# Patient Record
(demographics unavailable — no encounter records)

---

## 2017-03-13 NOTE — DIAGNOSTIC IMAGING REPORT
Indication: CP, cough



Technique: One view of the chest



Comparison: 12/25/2014



Findings: Lungs and pleural spaces are clear. Heart size is normal. No 

significant

interim change



Impression: No acute process

## 2017-03-13 NOTE — EMERGENCY ROOM REPORT
History of Present Illness


General


Chief Complaint:  General Complaint


Source:  Patient





Present Illness


HPI


Patient states has a history of fibroids.  She states she gets anemia and when 

she gets anemia she gets short of breath and fatigued and gets chest pain.  She 

relates this to every time she gets anemia.  She states that she has been 

bleeding heavily for a month and a half every day.  She states that she started 

bleeding again and has been having large clots over the past 2 days.  She 

states she also has chest pain, shortness of breath and is fatigued.  She has 

severe pain in her lower abdomen and is requesting Dilaudid.


Allergies:  


Coded Allergies:  


     NO KNOWN ALLERGIES (Verified  Allergy, Unknown, 5/31/14)


Uncoded Allergies:  


     unknown cause of itching (Allergy, Unknown, 5/31/14)


 Pt complains of itching but cause unknown





Patient History


Past Medical History:  see triage record, CVA/TIA - aneurysm, other - anemia, 

fibroids, chronic pain


Social History:  Reports: drug use - cocaine, Denies: alcohol use, smoking


Last Menstrual Period:  2/18/17


Pregnant Now:  No


Reviewed Nursing Documentation:  PMH: Agreed, PSxH: Agreed





Nursing Documentation-PMH


Past Medical History:  No Stated History


Hx Cardiac Problems:  Yes


Hx Hypertension:  Yes


Hx Asthma:  Yes


Hx Cancer:  No


Hx Gastrointestinal Problems:  No


Hx Neurological Problems:  Yes - BRAIN ANEURSYM 2009


Hx Cerebrovascular Accident:  Yes - brain aneurysm; clipped in 2009


Hx Headaches:  Yes


Hx Neurologic Surgery:  Yes - brain aneurysm





Review of Systems


All Other Systems:  negative except mentioned in HPI





Physical Exam





Vital Signs








  Date Time  Temp Pulse Resp B/P Pulse Ox O2 Delivery O2 Flow Rate FiO2


 


3/13/17 10:32 98.1 112 18 109/63 100 Room Air  








Sp02 EP Interpretation:  reviewed, normal


General Appearance:  no apparent distress, alert, GCS 15, non-toxic


Head:  normocephalic, atraumatic


Eyes:  bilateral eye PERRL, bilateral eye conjunctivae pale, bilateral eye 

normal inspection


ENT:  hearing grossly normal, normal pharynx, no angioedema, normal voice


Neck:  full range of motion, supple/symm/no masses


Respiratory:  chest non-tender, lungs clear, normal breath sounds, speaking 

full sentences


Cardiovascular #1:  no edema, tachycardia, systolic murmur


Gastrointestinal:  normal bowel sounds, soft, non-distended, no guarding, no 

rebound, tenderness - TTP supra-pubic and lower abdomen


Rectal:  deferred


Musculoskeletal:  back normal, gait/station normal, normal range of motion, non-

tender, calf tenderness


Neurologic:  alert, oriented x3, responsive, motor strength/tone normal, 

sensory intact, speech normal


Psychiatric:  judgement/insight normal, memory normal, mood/affect normal, no 

suicidal/homicidal ideation


Skin:  normal color, no rash, warm/dry, well hydrated





Medical Decision Making


Diagnostic Impression:  


 Primary Impression:  


 Anemia


 Additional Impressions:  


 Dysfunctional uterine bleeding


 Fibroids


ER Course


This patient presents with severe anemia.  This is likely related to vaginal 

bleeding and fibroids.  The patient has had normal vital signs during her ED 

course.  Therefore, she did not require emergency release blood and blood 

transfusion was delayed secondary to her having antibodies.  She is admitted 

for blood transfusion and further evaluation and treatment.  Likely this 

patient will need a hysterectomy at some point.  She is admitted for further 

evaluation and treatment.





Labs








Test


  3/13/17


10:55 3/13/17


12:00


 


Sodium Level


  139 mEQ/L


(135-145) 


 


 


Potassium Level


  3.1 mEQ/L


(3.4-4.9) 


 


 


Chloride Level


  101 mEQ/L


() 


 


 


Carbon Dioxide Level


  23 mEQ/L


(20-30) 


 


 


Anion Gap 15 (5-15)  


 


Blood Urea Nitrogen


  14 mg/dL


(7-23) 


 


 


Creatinine


  0.8 mg/dL


(0.5-0.9) 


 


 


Estimat Glomerular Filtration


Rate > 60 mL/min


(>60) 


 


 


Glucose Level


  117 mg/dL


() 


 


 


Calcium Level


  8.7 mg/dL


(8.6-10.2) 


 


 


Total Bilirubin


  0.2 mg/dL


(0.0-1.2) 


 


 


Aspartate Amino Transf


(AST/SGOT) 17 U/L (5-40) 


  


 


 


Alanine Aminotransferase


(ALT/SGPT) 8 U/L (3-33) 


  


 


 


Alkaline Phosphatase


  72 U/L


() 


 


 


Total Creatine Kinase


  67 U/L


() 


 


 


Creatine Kinase MB


  < 1.5 ng/mL (<


3.8) 


 


 


Creatine Kinase MB Relative


Index  


  


 


 


Troponin I


  < 0.30 ng/mL


(<=0.30) 


 


 


Pro-B-Type Natriuretic Peptide


  60 pg/mL


(0-125) 


 


 


Total Protein


  7.2 g/dL


(6.6-8.7) 


 


 


Albumin


  4.0 g/dL


(3.5-5.2) 


 


 


Globulin 3.2 g/dL  


 


Albumin/Globulin Ratio 1.2 (1.0-2.7)  


 


Lipase 26 U/L (< 60)  


 


Thyroid Stimulating Hormone


(TSH) 3.220 uIU/mL


(0.300-4.500) 


 


 


Free Thyroxine


  0.78 ng/dL


(0.86-1.85) 


 


 


Free Triiodothyronine


  3.1 pg/mL


(2.3-4.2) 


 


 


White Blood Count


  


  6.3 K/UL


(4.8-10.8)


 


Red Blood Count


  


  3.01 M/UL


(4.20-5.40)


 


Hemoglobin


  


  4.7 G/DL


(12.0-16.0)


 


Hematocrit


  


  16.9 %


(37.0-47.0)


 


Mean Corpuscular Volume  56 FL (80-99) 


 


Mean Corpuscular Hemoglobin


  


  15.7 PG


(27.0-31.0)


 


Mean Corpuscular Hemoglobin


Concent 


  27.8 G/DL


(32.0-36.0)


 


Red Cell Distribution Width


  


  15.6 %


(11.6-14.8)


 


Platelet Count


  


  338 K/UL


(150-450)


 


Mean Platelet Volume


  


  5.4 FL


(6.5-10.1)


 


Neutrophils (%) (Auto)   % (45.0-75.0) 


 


Lymphocytes (%) (Auto)   % (20.0-45.0) 


 


Monocytes (%) (Auto)   % (1.0-10.0) 


 


Eosinophils (%) (Auto)   % (0.0-3.0) 


 


Basophils (%) (Auto)   % (0.0-2.0) 


 


Differential Total Cells


Counted 


  100 


 


 


Neutrophils % (Manual)  36 % (45-75) 


 


Lymphocytes % (Manual)  51 % (20-45) 


 


Monocytes % (Manual)  10 % (1-10) 


 


Eosinophils % (Manual)  3 % (0-3) 


 


Basophils % (Manual)  0 % (0-2) 


 


Band Neutrophils  0 % (0-8) 


 


Platelet Estimate  Adequate 


 


Platelet Morphology  Normal 


 


Hypochromasia  3+ 


 


Anisocytosis  1+ 


 


Microcytosis  1+ 








EKG Diagnostic Results


Rate:  normal


Rhythm:  NSR


ST Segments:  no acute changes





Rhythm Strip Diag. Results


EP Interpretation:  yes


Rate:  80's


Rhythm:  NSR, no PVC's, no ectopy





Last Vital Signs








  Date Time  Temp Pulse Resp B/P Pulse Ox O2 Delivery O2 Flow Rate FiO2


 


3/13/17 13:35 98.1       


 


3/13/17 13:30  78 24 113/51 100 Room Air  








Disposition:  ADMITTED AS INPATIENT


Condition:  Serious


Referrals:  


NOT CHOSEN IPA/MD,REFERRING (PCP)











GRACE HANNA D.O. Mar 13, 2017 14:38

## 2017-03-14 NOTE — HISTORY AND PHYSICAL
History of Present Illness


General


Date patient seen:  Mar 14, 2017


Reason for Hospitalization:  General Complaint





Present Illness


HPI


49 year old female with hx of uterine  fibroids presented with CC of   bleeding 

heavily for a month and a half every day.  She states that she started bleeding 

again and has been having large clots over the past 2 days.  She states she 

also has chest pain, shortness of breath and is fatigued.  Her hemoglobin was 4

, therefore she is admitted for further evaluation


Allergies:  


Coded Allergies:  


     NO KNOWN ALLERGIES (Verified  Allergy, Unknown, 5/31/14)


Uncoded Allergies:  


     unknown cause of itching (Allergy, Unknown, 5/31/14)


 Pt complains of itching but cause unknown





Medication History


Scheduled


Albuterol Sulfate* (Proair Hfa*), 1 PUFF INH QID, (Reported)


Albuterol Sulfate* (Proair Hfa*), 2 PUFFS INH QID, (Reported)


Alprazolam* (Xanax*), 2 MG ORAL Q12HR, (Reported)


Ferrous Sulfate* (Ferrous Sulfate*), 325 MG ORAL THREE TIMES A DAY





Scheduled PRN


Temazepam* (Restoril*), 15 MG ORAL BEDTIME PRN for Insomnia, (Reported)





Discontinued Medications


Hydromorphone HCl (Dilaudid), 2 MG ORAL Q4H, (Reported)


   Discontinued Reason: MD discontinued med


Hydromorphone HCl (Dilaudid), 4 MG ORAL Q4H, (Reported)


   Discontinued Reason: MD discontinued med


Levofloxacin* (Levaquin*), 500 MG ORAL DAILY


   Discontinued Reason: MD discontinued med


Methylprednisolone (Methylprednisolone), 4 MG ORAL .as directed


   Discontinued Reason: MD discontinued med


Potassium Chloride* (K-Dur*), 20 MEQ ORAL TWICE A DAY


   Discontinued Reason: MD discontinued med


Promethazine/Dextromethorphan (Promethazine-Dm Syrup), 1 TSP ORAL Q4H PRN for 

For Cough


   Discontinued Reason: MD discontinued med





Patient History


Healthcare decision maker





Resuscitation status


Full Code


Advanced Directive on File


No





Past Medical/Surgical History


Past Medical/Surgical History:  


(1) Fibroids


(2) Opioid dependence





Review of Systems


All Other Systems:  negative except mentioned in HPI





Physical Exam


General Appearance:  WD/WN


Lines, tubes and drains:  peripheral


HEENT:  normocephalic


Neck:  non-tender, normal alignment


Respiratory/Chest:  chest wall non-tender, lungs clear


Cardiovascular/Chest:  normal peripheral pulses, normal rate


Abdomen:  normal bowel sounds, soft


Genitourinary/Rectal:  normal genital exam





Last 24 Hour Vital Signs








  Date Time  Temp Pulse Resp B/P Pulse Ox O2 Delivery O2 Flow Rate FiO2


 


3/14/17 12:00 97.5 84 18 137/56 99 Room Air  


 


3/14/17 08:09  78 18  100 Room Air  21


 


3/14/17 08:09  79 18  100 Room Air  21


 


3/14/17 08:09  79 18   Room Air  21


 


3/14/17 08:00  82      


 


3/14/17 04:00  86      


 


3/14/17 04:00 98.4 81 20 148/83 100 Room Air  


 


3/14/17 00:00 98.0 85 20 126/80 100 Room Air  


 


3/13/17 20:00  78      


 


3/13/17 19:30  80 20   Room Air  21


 


3/13/17 18:00  79      


 


3/13/17 17:39 97.9 83 20 107/86 100 Room Air  


 


3/13/17 17:35 97.9       


 


3/13/17 17:30 97.9 83 20 107/86 100 Room Air  

















Intake and Output  


 


 3/13/17 3/14/17





 19:00 07:00


 


Intake Total 1050 ml 1220 ml


 


Balance 1050 ml 1220 ml


 


  


 


Intake Oral 50 ml 720 ml


 


IV Total 1000 ml 


 


Blood Product  500 ml


 


# Voids 1 3











Laboratory Tests








Test


  3/14/17


03:35


 


White Blood Count


  5.7 K/UL


(4.8-10.8)


 


Red Blood Count


  3.84 M/UL


(4.20-5.40)  L


 


Hemoglobin


  7.6 G/DL


(12.0-16.0)  #L


 


Hematocrit


  24.5 %


(37.0-47.0)  #L


 


Mean Corpuscular Volume


  64 FL (80-99)


#L


 


Mean Corpuscular Hemoglobin


  19.9 PG


(27.0-31.0)  L


 


Mean Corpuscular Hemoglobin


Concent 31.1 G/DL


(32.0-36.0)  L


 


Red Cell Distribution Width


  24.5 %


(11.6-14.8)  H


 


Platelet Count


  339 K/UL


(150-450)


 


Mean Platelet Volume


  5.3 FL


(6.5-10.1)  L


 


Neutrophils (%) (Auto)


  % (45.0-75.0)


 


 


Lymphocytes (%) (Auto)


  % (20.0-45.0)


 


 


Monocytes (%) (Auto)  % (1.0-10.0)  


 


Eosinophils (%) (Auto)  % (0.0-3.0)  


 


Basophils (%) (Auto)  % (0.0-2.0)  


 


Prothrombin Time


  10.7 SEC


(9.30-11.50)


 


Prothromb Time International


Ratio 1.1 (0.9-1.1)  


 


 


Activated Partial


Thromboplast Time 30 SEC (23-33)


 


 


Sodium Level


  137 mEQ/L


(135-145)


 


Potassium Level


  4.2 mEQ/L


(3.4-4.9)


 


Chloride Level


  101 mEQ/L


()


 


Carbon Dioxide Level


  24 mEQ/L


(20-30)


 


Anion Gap 12 (5-15)  


 


Blood Urea Nitrogen


  12 mg/dL


(7-23)


 


Creatinine


  0.8 mg/dL


(0.5-0.9)


 


Estimat Glomerular Filtration


Rate > 60 mL/min


(>60)


 


Glucose Level


  97 mg/dL


()


 


Calcium Level


  8.4 mg/dL


(8.6-10.2)  L


 


Total Bilirubin


  < 0.2 mg/dL


(0.0-1.2)


 


Aspartate Amino Transf


(AST/SGOT) 14 U/L (5-40)  


 


 


Alanine Aminotransferase


(ALT/SGPT) 8 U/L (3-33)  


 


 


Alkaline Phosphatase


  65 U/L


()


 


Total Protein


  6.2 g/dL


(6.6-8.7)  L


 


Albumin


  3.3 g/dL


(3.5-5.2)  L


 


Globulin 2.9 g/dL  


 


Albumin/Globulin Ratio 1.1 (1.0-2.7)  


 


Thyroid Stimulating Hormone


(TSH) 1.760 uIU/mL


(0.300-4.500)








Height (Feet):  5


Height (Inches):  3.00


Weight (Pounds):  130


Medications





Current Medications








 Medications


  (Trade)  Dose


 Ordered  Sig/Melissa


 Route


 PRN Reason  Start Time


 Stop Time Status Last Admin


Dose Admin


 


 Acetaminophen


  (Tylenol)  650 mg  Q4H  PRN


 ORAL


 T>100.5  3/14/17 12:45


 4/13/17 12:44   


 


 


 Al Hydroxide/Mg


 Hydroxide


  (Mylanta II)  30 ml  Q6H  PRN


 ORAL


 dyspepsia  3/14/17 10:45


 4/13/17 10:44   


 


 


 Albuterol Sulfate


  (Proventil MDI)  1 puff  Q6H  PRN


 INH


 Shortness of Breath  3/14/17 14:00


 4/13/17 13:59   


 


 


 Alprazolam


  (Xanax)  2 mg  EVERY 12 HOURS  PRN


 ORAL


 For Anxiety  3/14/17 21:00


 3/21/17 20:59   


 


 


 Dextrose


  (Dextrose 50%)    STAT  PRN


 IV


 Hypoglycemia  3/14/17 16:45


 4/13/17 16:44   


 


 


 Ferrous Sulfate


  (Feosol)  325 mg  THREE TIMES A  DAY


 ORAL


   3/14/17 13:00


 4/13/17 12:59  3/14/17 14:29


 


 


 Hydromorphone HCl


  (Dilaudid)  4 mg  Q4H  PRN


 ORAL


 Breakthrough pain  3/14/17 12:45


 3/21/17 12:44   


 


 


 Lorazepam


  (Ativan 2mg/ml


 1ml)  0.5 mg  Q4H  PRN


 IV


 For Anxiety  3/14/17 12:45


 3/21/17 12:44   


 


 


 Morphine Sulfate


  (Morphine


 Sulfate)  1 mg  Q4H  PRN


 IVP


 PAIN 4-10  3/14/17 12:45


 3/21/17 12:44   


 


 


 Nicotine


  (Nicoderm)  1 patch  Q24H


 TDERMAL


   3/14/17 14:00


 4/13/17 13:59  3/14/17 14:30


 


 


 Ondansetron HCl


  (Zofran)  4 mg  Q6H  PRN


 IVP


 Nausea & Vomiting  3/14/17 10:45


 4/13/17 10:44   


 


 


 Polyethylene


 Glycol


  (Miralax)  17 gm  HSPRN  PRN


 ORAL


 Constipation  3/14/17 21:00


 4/13/17 20:59   


 


 


 Temazepam


  (Restoril)  15 mg  BEDTIME  PRN


 ORAL


 Insomnia  3/14/17 21:00


 3/21/17 20:59   


 


 


 Zolpidem Tartrate


  (Ambien)  5 mg  HSPRN  PRN


 ORAL


 Insomnia  3/14/17 21:00


 4/13/17 20:59   


 











Assessment/Plan


Problem List:  


(1) Dysfunctional uterine bleeding


ICD Codes:  N93.8 - Other specified abnormal uterine and vaginal bleeding


SNOMED:  94060846


(2) Anemia


ICD Codes:  D64.9 - Anemia, unspecified


SNOMED:  689107579


(3) Chronic pain


ICD Codes:  G89.29 - Other chronic pain


SNOMED:  82551485


Assessment/Plan


PRBC


check h/h


outpatient gyn referral.











JOSE MCCOY Mar 14, 2017 17:05

## 2017-03-15 NOTE — DIAGNOSTIC IMAGING REPORT
--------------- APPROVED REPORT --------------





CPT Code: 58797



Present Symptoms

Comments: Pain





BILATERAL: Imaging reveals a patent deep venous system bilaterally. There is no evidence 

of thrombus within the femoral, popliteal or tibial segments. The greater saphenous veins 

are also within normal limits. Doppler indicates normal spontaneous flow within these 

segments.

## 2017-03-16 NOTE — DISCHARGE SUMMARY
Discharge Summary


Hospital Course


Date of Admission


Mar 13, 2017 at 13:15


Date of Discharge


Mar 14, 2017 at 18:44


Admitting Diagnosis


SYMPTOMATIC ANEMIA


HPI


Taisha Guzman is a 49 year old female who was admitted on Mar 13, 2017 at 13:15 

for Symptomatic Anemia


Hospital Course


[id number]





Discharge


Discharge Disposition


Patient left AMA


Discharge Diagnoses:  











Pat Starkey NP Mar 16, 2017 11:34

## 2017-03-16 NOTE — CARDIOLOGY REPORT
--------------- APPROVED REPORT --------------





EKG Measurement

Heart [id number]

CO 152P59

[id number]

[id number]

[id number]





Normal sinus rhythm

Possible Left atrial enlargement

Borderline ECG

## 2017-03-17 NOTE — DISCHARGE SUMMARY 2 SIG
DATE OF ADMISSION:  03/13/2017



DATE OF DISCHARGE:  03/14/2017



BRIEF HOSPITAL COURSE:  The patient is a 49-year-old female with

history of uterine fibroids, presented with complaints of vaginal bleeding

heavily for a month and a half and started passing blood clots over the

past two days.  She had chest pain, shortness of breath and was fatigued.

On evaluation at ED, she was found to have severe anemia, hemoglobin of 4

and was admitted for further evaluation.  She was given 3 units of packed

RBC blood transfusion, however, full treatment was not carried out as the

patient signed out against medical advice.



FINAL DIAGNOSES:

1. Severe acute anemia requiring blood transfusion.

2. Dysfunctional uterine bleed.

3. Chronic pain.







  ______________________________________________

  Chasity Esteves M.D.



I have been assigned to dictate discharge summary on this account and I

was not involved in the patient's management.



  ______________________________________________

  Pat Starkey N.P.





DR:  DUSTY

D:  03/16/2017 11:35

T:  03/17/2017 01:51

JOB#:  [id number]

CC:



LYDIA

## 2018-06-15 NOTE — CONSULTATION
Consult Note


Consult Note


GYNECOLOGY CONSULT NOTE





CC: Vaginal Bleeding x 3 weeks





HPI: Ms Guzman is a 49yo  who was admitted with symptomatic anemia 2/2 

vaginal bleeding. She has known fibroids dx 5y ago, and is requesting 

hysterectomy. She is clinically stable, in NAD, and her bleeding is moderately 

heavy. She reports weakness and SOB. She has blood transfusion running now. 





PMH: Brain aneurysm clipped 7y ago, ?HTN (with pain and stress)


PSH: Brain aneurysm as above, D&C x 2, most recent 6-8mo ago by her GYN Dr. Phillip in [place]. Hx of tubal reanastomosis ~15y ago with Dr. Wisdom


Meds: Alprazolam 2mg BID, Lisinopril 2.5 qday although does not take daily


Allergies: NKDA


OBHx:  - TAB x1


GYNHx: +Fibroids, +cysts, +endometriosis 


FamHx: Mother, paternal aunts and paternal grandmother all  from 

aneurysms


SocHx: Smokes 1/2 pack per day since age 15, drinks occasionally and snorts 

cocaine occasionally





VITALS: 


/87, P 83, T 98.4, RR 20, O2 sat 100% RA





EXAM:


Gen: mild pallor, NAD


HEENT: MM dry, OP clear


CV: RRR


Pulm: No increased work of breathing


Abd: soft, NT, palpable fibroids


Pelvic: No heavy active bleeding, pad moderately soaked after >1 hour


Ext: FROM


Neuro: CN 2-12 intact grossly





Labs:











Test


  6/15/18


12:15


 


White Blood Count


  8.1 K/UL


(4.8-10.8)


 


Red Blood Count


  3.34 M/UL


(4.20-5.40)  L


 


Hemoglobin


  6.7 G/DL


(12.0-16.0)  *L


 


Hematocrit


  22.5 %


(37.0-47.0)  L


 


Mean Corpuscular Volume


  67 FL (80-99)


L


 


Mean Corpuscular Hemoglobin


  20.0 PG


(27.0-31.0)  L


 


Mean Corpuscular Hemoglobin


Concent 29.6 G/DL


(32.0-36.0)  L


 


Red Cell Distribution Width


  17.0 %


(11.6-14.8)  H


 


Platelet Count


  394 K/UL


(150-450)


 


Mean Platelet Volume


  6.2 FL


(6.5-10.1)  L


 


Neutrophils (%) (Auto)


  % (45.0-75.0)


 


 


Lymphocytes (%) (Auto)


  % (20.0-45.0)


 


 


Monocytes (%) (Auto)  % (1.0-10.0)  


 


Eosinophils (%) (Auto)  % (0.0-3.0)  


 


Basophils (%) (Auto)  % (0.0-2.0)  


 


Differential Total Cells


Counted 100  


 


 


Neutrophils % (Manual) 65 % (45-75)  


 


Lymphocytes % (Manual) 24 % (20-45)  


 


Monocytes % (Manual) 9 % (1-10)  


 


Eosinophils % (Manual) 0 % (0-3)  


 


Basophils % (Manual) 2 % (0-2)  


 


Band Neutrophils 0 % (0-8)  


 


Platelet Estimate Adequate  


 


Platelet Morphology Normal  


 


Hypochromasia 2+  


 


Anisocytosis 1+  


 


Ovalocytes 1+  


 


Sodium Level


  139 MMOL/L


(136-145)


 


Potassium Level


  4.0 MMOL/L


(3.5-5.1)


 


Chloride Level


  104 MMOL/L


()


 


Carbon Dioxide Level


  25 MMOL/L


(21-32)


 


Anion Gap


  10 mmol/L


(5-15)


 


Blood Urea Nitrogen


  11 mg/dL


(7-18)


 


Creatinine


  0.7 MG/DL


(0.55-1.30)


 


Estimat Glomerular Filtration


Rate > 60 mL/min


(>60)


 


Glucose Level


  103 MG/DL


()


 


Calcium Level


  8.4 MG/DL


(8.5-10.1)  L


 


Total Bilirubin


  0.3 MG/DL


(0.2-1.0)


 


Aspartate Amino Transf


(AST/SGOT) 26 U/L (15-37)


 


 


Alanine Aminotransferase


(ALT/SGPT) 17 U/L (12-78)


 


 


Alkaline Phosphatase


  69 U/L


()


 


Total Creatine Kinase


  162 U/L


()


 


Creatine Kinase MB


  0.7 NG/ML


(0.0-3.6)


 


Creatine Kinase MB Relative


Index 0.4  


 


 


Total Protein


  7.3 G/DL


(6.4-8.2)


 


Albumin


  3.5 G/DL


(3.4-5.0)


 


Globulin 3.8 g/dL  


 


Albumin/Globulin Ratio


  0.9 (1.0-2.7)


L


 


Lipase


  90 U/L


()





IMAGING: Pelvic US ordered stat


Assessment/Plan


49yo with known hx of fibroids, here for symptomatic anemia secondary to heavy 

vaginal bleeding. Bleeding moderate now, patient hemodynamically stable, no 

indication for emergent surgical management at this time





- No indication for emergency hysterectomy, however patient would benefit from 

removal of her uterus. 


- Attempted to help patient locate a provider who accepts her insurance, but 

patient states she is going to switch to [facility] so she declined any 

recommendations from me for GYN providers in the community


- H/H 6.7/22.5, receiving pRBCs now


- Pelvic US ordered, if unable to complete by the time patient is ready to 

leave can forego (patient has prior imaging noting multiple fibroids)


- Start Provera 10mg BID x 7d, then continue daily until she can establish care 

with a GYN provider who accepts her insurance


- Start PO iron TID with Colace


- Monitor vitals per protocol





Once H/H stabilize and patient meets criteria for discharge, recommend follow 

up with GYN and continue Provera, Iron, and Colace.





Thank you for this interesting consult.











Rose West M.D. Jt 15, 2018 16:42

## 2018-06-15 NOTE — HISTORY AND PHYSICAL REPORT
DATE OF ADMISSION:  06/15/2018



REASON FOR ADMISSION:

1. Anemia.

2. Dysfunctional uterine bleeding.



HISTORY OF PRESENT ILLNESS:  The patient is a 50-year-old female, who

relates a history of dysfunctional uterine bleeding for approximately the

past year.  She says six months ago, she had a gynecologist try medical

therapy to help prevent the dysfunctional uterine bleeding from uterine

fibroids, however, this was unsuccessful and the patient underwent a D and

C type procedure, which helped for few months, but she says over the past

six months, her dysfunctional uterine bleeding has returned.  She had been

to [facility] for her dysfunctional uterine bleeding in April 2018, however, she says that her hemoglobin was in the mid 8 range and she

went home, tried to get outpatient management, but was unsuccessful and

over the past few months again, the patient has noted more vaginal

bleeding and heavy soaking with her tampon pads and presented to the

emergency room and it was noted that her hemoglobin was low again this

time at 6.7.  As such, she is being admitted for blood transfusion,

hemodynamic stability, and further evaluation of her uterine

fibroids/dysfunctional uterine bleeding.



PAST MEDICAL HISTORY:

1. Uterine fibroids.

2. Dysfunctional uterine bleeding.

3. Hypertension.

4. Anxiety.



PAST SURGICAL HISTORY:  D and C procedure approximately 8 months

ago.



ALLERGIES:  No known drug allergies.



FAMILY HISTORY:  Positive for hypertension.



REVIEW OF SYSTEMS:  NEUROLOGIC:  The patient denies headache, change in

vision, syncope, or presyncopal episodes.  CARDIOVASCULAR:  No current

chest pain, palpitations, or angina.  PULMONARY:  No difficulty breathing,

productive cough, or sputum.  GASTROINTESTINAL/GENITOURINARY:  No changes

in urinary or bowel habits.  No nausea, vomiting, or diarrhea.  She was

having vaginal bleeding.  ENDOCRINOLOGY:  No night sweats, fevers, or

chills.  MUSCULOSKELETAL:  The patient is feeling weak, tired, and

fatigued.



PHYSICAL EXAMINATION:

VITAL SIGNS:  Blood pressure 122/66, 100% oxygen saturation on room air,

pulse rate of 85, and temperature 97.8.

GENERAL:  The patient is awake, alert, and not otherwise in

distress.

HEENT:  Extraocular muscle intact.  No lymphadenopathy noted.

CARDIOVASCULAR:   S1, S2.  No rubs or gallops.

PULMONARY:  Clear to auscultation bilaterally.  No rales, rhonchi or

wheezes.

ABDOMEN:  Nondistended and nontender.

EXTREMITY:  No edema noted.



LABORATORY DATA:  Labs dated 06/15/2018, white cell count 8.1, hemoglobin

6.7, and platelet count 394,000.  Sodium 139, potassium 4, creatinine 0.7,

and calcium 8.4.  AST and ALT are 26 and 17 respectively.  .

Lipase 90



ASSESSMENT AND PLAN:

1. Anemia.  Hemoglobin 6.7 secondary to dysfunctional uterine bleeding

from fibroids.  The patient will be given a blood transfusion.

Gynecology, Dr. Apodaca has been consulted for further evaluation and

management.  The patient is currently stable.

2. Hypertension.  Continue lisinopril.

3. Dehydration.  We will initiate intravenous fluids.

4. Deep venous thrombosis prophylaxis with SCDs.

5. Anxiety.  Continue home dose benzodiazepines.









  ______________________________________________

  Dimitris Alatorre MD





DR:  SHAQ

D:  06/15/2018 16:26

T:  06/15/2018 19:29

JOB#:  [id number]

CC:

## 2018-06-15 NOTE — EMERGENCY ROOM REPORT
History of Present Illness


General


Chief Complaint:  Pain


Source:  Patient





Present Illness


HPI


Patient presents with complaints of shortness of breath and vaginal bleeding 

patient also complains of headache and feels that her blood count is low 

patient has had uterine fibroids


Had D&C procedure performed by her oncologist 6 months ago however has had 

difficulty obtaining follow-up and appropriate definitive care





Denies any focal weakness denies any fevers or chills


Patient has a mild cough


Allergies:  


Coded Allergies:  


     NO KNOWN ALLERGIES (Verified  Allergy, Unknown, 18)


Uncoded Allergies:  


     unknown cause of itching (Allergy, Unknown, 14)


 Pt complains of itching but cause unknown





Patient History


Past Medical History:  see triage record


Pertinent Family History:  none


Last Menstrual Period:  6/15/18


Pregnant Now:  No - heavy bleeding now


:  0


Para:  0


Reviewed Nursing Documentation:  PMH: Agreed; PSxH: Agreed





Nursing Documentation-PMH


Hx Cardiac Problems:  Yes


Hx Hypertension:  Yes


Hx Asthma:  Yes


Hx Cancer:  No


Hx Gastrointestinal Problems:  No


Hx Neurological Problems:  Yes


Hx Cerebrovascular Accident:  Yes - brain aneurysm; clipped in 


Hx Headaches:  Yes


Hx Neurologic Surgery:  Yes - brain anuerysm clip 





Review of Systems


All Other Systems:  negative except mentioned in HPI





Physical Exam





Vital Signs








  Date Time  Temp Pulse Resp B/P (MAP) Pulse Ox O2 Delivery O2 Flow Rate FiO2


 


6/15/18 11:44 98.4 102 24 126/79 100 Room Air  





 98.4       








Sp02 EP Interpretation:  reviewed, normal


General Appearance:  well appearing, no apparent distress


Head:  normocephalic, atraumatic


Eyes:  bilateral eye PERRL, bilateral eye EOMI


ENT:  hearing grossly normal, normal pharynx, TMs + canals normal, uvula midline


Neck:  full range of motion, supple, no meningismus, no bony tend


Respiratory:  lungs clear, normal breath sounds, no rhonchi, no respiratory 

distress, no retraction, no accessory muscle use


Cardiovascular #1:  normal peripheral pulses, regular rate, rhythm, no edema, 

no gallop, no JVD, no murmur


Gastrointestinal:  normal bowel sounds, non tender, soft, no mass, no 

organomegaly, non-distended, no guarding, no hernia, no pulsatile mass, no 

rebound


Genitourinary:  no CVA tenderness


Musculoskeletal:  normal inspection


Neurologic:  oriented x3, responsive, CNs III-XII nml as tested, motor strength/

tone normal, sensory intact


Psychiatric:  mood/affect normal


Skin:  normal color, no rash, warm/dry, palpation normal


Lymphatic:  normal inspection, no adenopathy





Medical Decision Making


Diagnostic Impression:  


 Primary Impression:  


 symptomatic anemia


ER Course


Given the patient's complaints and presentation symptomatic anemia is 

considered along with possible other little slight pathology





Patient's hemoglobin count is below 7





Patient remains hemodynamically stable


Given the complaints and presentation patient will require further transfusion





Patient reports that she has gone back-and-forth between [facility] in this 

facility


She reports that she feels like she needs a total hysterectomy however has not 

been able to get 1 over the past 4 years.





I attempted discussion with her regarding the need for improved outpatient care


I discussed possible other facilities, with appropriate clinics however patient 

is frustrated with her attempts of outpatient care.





Labs








Test


  6/15/18


12:15


 


White Blood Count


  8.1 K/UL


(4.8-10.8)


 


Red Blood Count


  3.34 M/UL


(4.20-5.40)


 


Hemoglobin


  6.7 G/DL


(12.0-16.0)


 


Hematocrit


  22.5 %


(37.0-47.0)


 


Mean Corpuscular Volume 67 FL (80-99) 


 


Mean Corpuscular Hemoglobin


  20.0 PG


(27.0-31.0)


 


Mean Corpuscular Hemoglobin


Concent 29.6 G/DL


(32.0-36.0)


 


Red Cell Distribution Width


  17.0 %


(11.6-14.8)


 


Platelet Count


  394 K/UL


(150-450)


 


Mean Platelet Volume


  6.2 FL


(6.5-10.1)


 


Neutrophils (%) (Auto)  % (45.0-75.0) 


 


Lymphocytes (%) (Auto)  % (20.0-45.0) 


 


Monocytes (%) (Auto)  % (1.0-10.0) 


 


Eosinophils (%) (Auto)  % (0.0-3.0) 


 


Basophils (%) (Auto)  % (0.0-2.0) 


 


Differential Total Cells


Counted 100 


 


 


Neutrophils % (Manual) 65 % (45-75) 


 


Lymphocytes % (Manual) 24 % (20-45) 


 


Monocytes % (Manual) 9 % (1-10) 


 


Eosinophils % (Manual) 0 % (0-3) 


 


Basophils % (Manual) 2 % (0-2) 


 


Band Neutrophils 0 % (0-8) 


 


Platelet Estimate Adequate 


 


Platelet Morphology Normal 


 


Hypochromasia 2+ 


 


Anisocytosis 1+ 


 


Ovalocytes 1+ 


 


Sodium Level


  139 MMOL/L


(136-145)


 


Potassium Level


  4.0 MMOL/L


(3.5-5.1)


 


Chloride Level


  104 MMOL/L


()


 


Carbon Dioxide Level


  25 MMOL/L


(21-32)


 


Anion Gap


  10 mmol/L


(5-15)


 


Blood Urea Nitrogen


  11 mg/dL


(7-18)


 


Creatinine


  0.7 MG/DL


(0.55-1.30)


 


Estimat Glomerular Filtration


Rate > 60 mL/min


(>60)


 


Glucose Level


  103 MG/DL


()


 


Calcium Level


  8.4 MG/DL


(8.5-10.1)


 


Total Bilirubin


  0.3 MG/DL


(0.2-1.0)


 


Aspartate Amino Transf


(AST/SGOT) 26 U/L (15-37) 


 


 


Alanine Aminotransferase


(ALT/SGPT) 17 U/L (12-78) 


 


 


Alkaline Phosphatase


  69 U/L


()


 


Total Creatine Kinase


  162 U/L


()


 


Creatine Kinase MB


  0.7 NG/ML


(0.0-3.6)


 


Creatine Kinase MB Relative


Index 0.4 


 


 


Total Protein


  7.3 G/DL


(6.4-8.2)


 


Albumin


  3.5 G/DL


(3.4-5.0)


 


Globulin 3.8 g/dL 


 


Albumin/Globulin Ratio 0.9 (1.0-2.7) 


 


Lipase


  90 U/L


()











Last Vital Signs








  Date Time  Temp Pulse Resp B/P (MAP) Pulse Ox O2 Delivery O2 Flow Rate FiO2


 


6/15/18 11:44 98.4 102 24 126/79 100 Room Air  





 98.4       








Status:  improved


Disposition:  ADMITTED AS INPATIENT


Condition:  Serious











AlexanderjulissaMal MCKNIGHT Jt 15, 2018 12:04

## 2018-06-15 NOTE — DIAGNOSTIC IMAGING REPORT
Indication: Shortness of breath

 

Technique: One view of the chest

 

Comparison: 4/28/2018

 

Findings: Lungs and pleural spaces are clear. Heart size is normal. No significant

change

 

Impression: No acute process

## 2018-06-15 NOTE — DIAGNOSTIC IMAGING REPORT
EXAM:

  US Pelvis Complete, Transabdominal and transvaginal

 

CLINICAL HISTORY:

  BLD

 

TECHNIQUE:

  Real-time transabdominal and transvaginal pelvic ultrasound (complete) 

with image documentation.

 

COMPARISON:

  5/3/16

 

FINDINGS:

  Uterus/cervix: The uterus is again enlarged and distorted measuring 11.

4 x 9.8 x 5.8 cm.  Once again, there are multiple masses, likely fibroids.

  Largest of these measures up to 6.1 cm in maximal dimension.  Limited 

assessment of the endometrial canal due to these masses, but on one image 

the endometrium is measured at 1.1 cm.  This could be normal if the 

patient is premenopausal or perimenopausal or taking hormone supplements. 

 Otherwise, this would be concerning for hyperplasia or neoplasm.  1.2 cm 

nabothian cyst of the cervix.

  Right ovary: 2 cysts within the right ovary, one measuring 2.5 cm and 

the other 1.6 cm.  No sonographic findings of torsion.

  Left ovary: Not identified.

  Free fluid: Trace free fluid.

 

IMPRESSION:     

  Multiple uterine masses suggesting fibroids.  Endometrium measures 1.1 

cm.  This could be normal if the patient is premenopausal or 

perimenopausal or taking hormone supplements.  Otherwise, this would be 

concerning for hyperplasia or neoplasm.

 

2 right ovarian cysts, the larger measuring 2.5 cm.  No sonographic 

findings of torsion.

 

Left ovary not identified.

## 2018-06-16 NOTE — DISCHARGE INSTRUCTIONS
Discharge Instructions


Discharge Instructions


Diet:  2 GM sodium (low sodium)


Resume Normal Activity?:  Yes


Activity:  light activity


Follow Up Orders


1. F/U GYN 1 week





For Congestive Heart Failure


Reminder


Report to your physician any weight gain of 5 pounds or more in one week.











Dimitris Alatorre M.D. Jun 16, 2018 08:41

## 2018-06-16 NOTE — NEPHROLOGY PROGRESS NOTE
Assessment/Plan


Assessment/Plan


1. Anemia- dysfunctional uterine bleeding


      - appreciate GYN evaluation and recc's


      - Provera/Iron/Colace at DC


      - will DC today if Hgb stable


2. Anxiety- on Xanax


3. Tob Dep- on nicotine patch


4. DVT prophylaxis with SCDs





Subjective


Date patient seen:  Jun 16, 2018


Time patient seen:  08:37


ROS Limited/Unobtainable:  No


Allergies:  


Coded Allergies:  


     NO KNOWN ALLERGIES (Verified  Allergy, Unknown, 4/28/18)


Uncoded Allergies:  


     unknown cause of itching (Allergy, Unknown, 5/31/14)


 Pt complains of itching but cause unknown


All Systems:  reviewed and negative except above


Subjective


Patient feels well, in no distress





Objective





Last 24 Hour Vital Signs








  Date Time  Temp Pulse Resp B/P (MAP) Pulse Ox O2 Delivery O2 Flow Rate FiO2


 


6/16/18 04:00 97.7 86 19 127/84 99 Room Air  





 97.7       


 


6/16/18 04:00      Room Air  


 


6/16/18 00:00 98.1 84 19 132/86 96 Room Air  





 98.1       


 


6/15/18 21:00 98.2 89 18 131/83 100 Room Air  





 98.2       


 


6/15/18 20:41    131/83    


 


6/15/18 16:45 97.9 85 20 131/79 97   





 97.9       


 


6/15/18 16:32 98.4 83 20 104/87 100 Room Air  





 209.1       


 


6/15/18 15:52 98.4       


 


6/15/18 15:45 97.8 85 20     





 97.8       


 


6/15/18 15:30 98.4 88 20     





 98.4       


 


6/15/18 15:00  83 20 122/66 100 Room Air  


 


6/15/18 14:54 98.4       


 


6/15/18 13:04 98.4       


 


6/15/18 13:01 98.4       


 


6/15/18 13:00  76 25 125/61 100 Room Air  


 


6/15/18 12:29 98.4       


 


6/15/18 11:44 98.4 102 24 126/79 100 Room Air  





 98.4       

















Intake and Output  


 


 6/15/18 6/16/18





 19:00 07:00


 


Intake Total 240 ml 1535 ml


 


Balance 240 ml 1535 ml


 


  


 


Intake Oral 240 ml 960 ml


 


IV Total  325 ml


 


Blood Product  250 ml


 


# Voids  3


 


# Bowel Movements 1 








Laboratory Tests


6/15/18 12:15: 


White Blood Count 8.1, Red Blood Count 3.34L, Hemoglobin 6.7*L, Hematocrit 22.5L

, Mean Corpuscular Volume 67L, Mean Corpuscular Hemoglobin 20.0L, Mean 

Corpuscular Hemoglobin Concent 29.6L, Red Cell Distribution Width 17.0H, 

Platelet Count 394, Mean Platelet Volume 6.2L, Neutrophils (%) (Auto) , 

Lymphocytes (%) (Auto) , Monocytes (%) (Auto) , Eosinophils (%) (Auto) , 

Basophils (%) (Auto) , Differential Total Cells Counted 100, Neutrophils % (

Manual) 65, Lymphocytes % (Manual) 24, Monocytes % (Manual) 9, Eosinophils % (

Manual) 0, Basophils % (Manual) 2, Band Neutrophils 0, Platelet Estimate 

Adequate, Platelet Morphology Normal, Hypochromasia 2+, Anisocytosis 1+, 

Ovalocytes 1+, Sodium Level 139, Potassium Level 4.0, Chloride Level 104, 

Carbon Dioxide Level 25, Anion Gap 10, Blood Urea Nitrogen 11, Creatinine 0.7, 

Estimat Glomerular Filtration Rate > 60, Glucose Level 103, Calcium Level 8.4L, 

Total Bilirubin 0.3, Aspartate Amino Transf (AST/SGOT) 26, Alanine 

Aminotransferase (ALT/SGPT) 17, Alkaline Phosphatase 69, Total Creatine Kinase 

162, Creatine Kinase MB 0.7, Creatine Kinase MB Relative Index 0.4, Total 

Protein 7.3, Albumin 3.5, Globulin 3.8, Albumin/Globulin Ratio 0.9L, Lipase 90


6/15/18 22:34: 


White Blood Count 7.4, Red Blood Count 3.42L, Hemoglobin 7.7L, Hematocrit 24.4L

, Mean Corpuscular Volume 71L, Mean Corpuscular Hemoglobin 22.4L, Mean 

Corpuscular Hemoglobin Concent 31.3L, Red Cell Distribution Width 18.6H, 

Platelet Count 313, Mean Platelet Volume 5.6L, Neutrophils (%) (Auto) , 

Lymphocytes (%) (Auto) , Monocytes (%) (Auto) , Eosinophils (%) (Auto) , 

Basophils (%) (Auto) , Differential Total Cells Counted 100, Neutrophils % (

Manual) 47, Lymphocytes % (Manual) 41, Monocytes % (Manual) 6, Eosinophils % (

Manual) 4H, Basophils % (Manual) 2, Band Neutrophils 0, Platelet Estimate 

Adequate, Platelet Morphology Normal, Hypochromasia 2+, Anisocytosis 2+


Height (Feet):  5


Height (Inches):  3.00


Weight (Pounds):  130


General Appearance:  no apparent distress, alert


EENT:  normal ENT inspection


Neck:  normal alignment, supple


Cardiovascular:  normal rate, regular rhythm


Respiratory/Chest:  lungs clear, normal breath sounds


Abdomen:  non tender, soft


Edema:  no edema noted Arm (L), no edema noted Arm (R), no edema noted Leg (L), 

no edema noted Leg (R), no edema noted Pedal (L), no edema noted Pedal (R), no 

edema noted Generalized











Dimitris Alatorre M.D. Jun 16, 2018 08:40

## 2018-06-16 NOTE — CARDIOLOGY REPORT
--------------- APPROVED REPORT --------------





EKG Measurement

Heart [id number]

KS [id number]

[id number]

[id number]

[id number]





Normal sinus rhythm

Possible Left atrial enlargement

Left ventricular hypertrophy

Abnormal ECG

## 2018-06-18 NOTE — DISCHARGE SUMMARY
Discharge Summary


Discharge Summary


_


DATE OF ADMISSION: 06/15/2018





DATE OF DISCHARGE: 06/16/2018





REASON FOR ADMISSION: 


50 years old female with  past medical history of dysfunctional uterine 

bleeding  for approximately 1 year due to uterine fibroids,  hypertension, 

anxiety, smoking,  presented to ED with complaints of  vaginal bleeding.   


About 6 months ago  she was  started on  medical therapy to help prevent 

dysfunctional uterine bleeding.  However medical treatment was unsuccessful , 

and patient underwent D&C  procedure which helped for  few months.  


However patient reported that over the past few months she noted  vaginal 

bleeding with  heavily soaked tampons. Upon evaluation in the ED,  hemoglobin 

with hemoglobin 6.7 ,hematocrit 22.5.  


Patient admitted for blood transfusion , hemodynamic stability and further 

evaluation of dysfunctional uterine bleeding with  admitting diagnosis of 

symptomatic anemia secondary to dysfunctional uterine bleeding,


hypertension,dehydration, anxiety. 


 


CONSULTANTS:


OB/GYN Dr. West





HOSPITAL COURSE: 


Patient admitted to the hospital.  Patient started on the IV fluids.  Patient 

was transfused with 2 units of packed red blood cells.  Patient started on iron 

supplements.  


Prior to discharge hemoglobin 9.1 ,hematocrit 29.5.  


DVT prophylaxis  with sequential compression device provided.


Blood pressure was managed with ACE inhibitor and remained stable.  


Home dose  of benzodiazepine   resumed.


Transabdominal transvaginal ultrasound revealed multiply uterine masses , 

suggesting fibroids.   OB/GYN specialist consult was requested.  


Per GYN specialist,  patient hemodynamically stable,  no indication for 

emergent surgical management at this time.  


Patient had known history of fibroids,  bleeding was moderate patient was 

transfuses 2 units of packed red blood cells, hemoglobin and hematocrit 

stabilized..


GYN started patient on Provera 10 mg twice a day for 7 days and then    

continue daily , until she can establish care with  GYN provided per her  

insurance.  


Patient started on iron supplements 3 times a day and Colace.  


Patient was counseled on smoking cessation.  


Patient started on nicotine patch.  


GYN and specialist cleared patient for discharge .


Due to rapid and unexpected improvement in patient's condition, the patient was 

discharged in one day. 





FINAL DIAGNOSES: 


Symptomatic anemia secondary to dysfunctional uterine bleeding from fibroids


Hypertension


Dehydration


Anxiety





DISCHARGE MEDICATIONS:


See Medication Reconciliation list.





DISCHARGE INSTRUCTIONS:


Patient was discharged home.  Outpatient follow-up with the primary care 

provider with referral to  OB/GYN specialist per insurance





I have been assigned to dictate discharge summary for this account. I was not 

involved in the patient's management.











Miroslava Lares NP Jun 18, 2018 08:54

## 2018-09-21 NOTE — DIAGNOSTIC IMAGING REPORT
EXAM:

  XR Right Knee, 3 views

 

CLINICAL HISTORY:

  PAIN

 

TECHNIQUE:

  Three views of the right knee.

 

COMPARISON:

  No relevant prior studies available.

 

FINDINGS:

  Bones/joints:  No acute osseous abnormality.  No significant knee joint 

effusion.  Medial and lateral compartment mild degenerative findings.  No 

dislocation.

  Soft tissues:  Unremarkable.

 

IMPRESSION:     

1.  No acute osseous abnormality.

2.  No significant knee joint effusion.

3.  Medial and lateral compartment mild degenerative findings.

## 2018-09-21 NOTE — EMERGENCY ROOM REPORT
History of Present Illness


General


Chief Complaint:  Pain


Source:  Patient





Present Illness


HPI


52 YO female presents to the ED c/O 9/10 in severity pain, swelling and 

tenderness to the right knee after exercising on a hill. Pt. denies trauma or 

fall, denies recent open wounds, or hx of gout. reports pain is exacerbated 

with weight bearing. Denies instability, clicking or inability to bend/

straighten the leg.


Allergies:  


Coded Allergies:  


     NO KNOWN ALLERGIES (Verified  Allergy, Unknown, 4/28/18)


Uncoded Allergies:  


     unknown cause of itching (Allergy, Unknown, 5/31/14)


 Pt complains of itching but cause unknown





Patient History


Past Medical History:  see triage record


Past Surgical History:  none


Pertinent Family History:  none


Pregnant Now:  No


Reviewed Nursing Documentation:  PMH: Agreed; PSxH: Agreed





Nursing Documentation-PMH


Past Medical History:  No History, Except For


Hx Cardiac Problems:  Yes


Hx Hypertension:  Yes


Hx Asthma:  Yes


Hx Cancer:  No


Hx Gastrointestinal Problems:  No


Hx Neurological Problems:  Yes - anxiety


Hx Cerebrovascular Accident:  Yes - brain aneurysm; clipped in 2009


Hx Headaches:  Yes


Hx Neurologic Surgery:  Yes - brain anuerysm clip 2009





Review of Systems


All Other Systems:  negative except mentioned in HPI





Physical Exam





Vital Signs








  Date Time  Temp Pulse Resp B/P (MAP) Pulse Ox O2 Delivery O2 Flow Rate FiO2


 


9/21/18 20:17 98.3 94 18 156/87 99 Room Air  





 98.2       








Sp02 EP Interpretation:  reviewed, normal


General Appearance:  no apparent distress, alert, GCS 15, non-toxic


Head:  normocephalic, atraumatic


ENT:  hearing grossly normal, normal voice


Neck:  full range of motion


Respiratory:  lungs clear, normal breath sounds, speaking full sentences


Cardiovascular #1:  regular rate, rhythm, no edema


Musculoskeletal:  back normal, gait/station normal - compensatory, normal range 

of motion, swelling - Right anterior knee with ttp. , tender - anterior right 

knee.


Neurologic:  alert, oriented x3, responsive, motor strength/tone normal, 

sensory intact, speech normal, grossly normal


Psychiatric:  judgement/insight normal


Skin:  normal color, no rash, warm/dry, well hydrated





Medical Decision Making


PA Attestation


Dr. joshi is my supervising Physician whom patient management has been 

discussed with.


Diagnostic Impression:  


 Primary Impression:  


 Knee effusion, right


 Additional Impression:  


 Knee pain, right anterior


ER Course


52 YO female presents to the ED c/O 9/10 in severity pain, swelling and 

tenderness to the right knee after exercising on a hill. Pt. denies trauma or 

fall, denies recent open wounds, or hx of gout. reports pain is exacerbated 

with weight bearing. Denies instability, clicking or inability to bend/

straighten the leg. 





Ddx considered but are not limited to Fracture, dislocation, contusion,  Sprain/

Strain/Spasm,   Epidural abscess,  Neoplastic mets.





Vital signs: are WNL, pt. is afebrile


H&PE are most consistent with musculoskeletal injury will perform imaging to r/

o fractures/dislocations. 





ORDERS:





-  X-ray Knee 3 views  - negative for fx, Dislocation, or significant soft 

tissue injury, per preliminary read in ED, and signed by  ALDA Diaz, my 

supervising physician has reviewed, and agrees with my interpretation.





ED INTERVENTIONS:





- Motrin 600mg PO


-Ace wrap applied  by ED tech. Pt. remains neurovascularly intact.








DISCHARGE: At this time pt. is stable for d/c to home. Will provide printed 

patient care instructions, and any necessary prescriptions. Care plan and 

follow up instructions have been discussed with the patient prior to discharge.


Other X-Ray Diagnostic Results


Other X-Ray Diagnostic Results :  


   X-Ray ordered:  Right Knee


   # of Views/Limited Vs Complete:  3 View


   Indication:  Pain


   EP Interpretation:  Yes


   PA Xray:  Interpretation reviewed, by supervising MD, and agrees with 

findings.


   Interpretation:  no dislocation, no soft tissue swelling, no fractures


   Impression:  No acute disease


   Electronically Signed by:  Marah Diaz PA-C





Last Vital Signs








  Date Time  Temp Pulse Resp B/P (MAP) Pulse Ox O2 Delivery O2 Flow Rate FiO2


 


9/21/18 20:17 98.3 94 18 156/87 99 Room Air  





 98.2       








Disposition:  HOME, SELF-CARE


Condition:  Stable


Scripts


Diclofenac Sodium (VOLTAREN) 100 Gm Gel..gram.


1 APPLIC TP BID, #1000 GM


   Prov: Marah Diaz         9/21/18 


Ibuprofen* (MOTRIN*) 800 Mg Tablet


800 MG ORAL THREE TIMES A DAY, #20 TAB 0 Refills


   Prov: Marah Diaz         9/21/18


Patient Instructions:  Knee Effusion, Easy-to-Read, Knee Pain, Easy-to-Read





Additional Instructions:  


Take medications as directed. 


 ** Follow up with a Primary Care Provider in 3-5 days, even if your symptoms 

have resolved. ** 


--Please review list of primary care clinics, if you do not already have a 

primary care provider





Return sooner to ED if new symptoms occur, or current symptoms become worse. 











- Please note that this Emergency Department Report was dictated using [email or web address] technology software, occasionally this can lead to 

erroneous entry secondary to interpretation by the dictation equipment.











Marah Diaz Sep 21, 2018 20:56

## 2018-09-24 NOTE — EMERGENCY ROOM REPORT
History of Present Illness


General


Chief Complaint:  Chest Pain


Source:  Patient





Present Illness


HPI


Is a 51-year-old female with a history of fibroid was severe bleeding requiring 

blood transfusion.  She was admitted here last time for it.  She received blood 

transfusion and was placed on birth control pill.  Since then bleeding is much 

better.  She woke up this morning with left-sided chest pain.  Pain is sharp.  

9 out of 10.  Worse with palpation.  Slightly short of breath.  No fever chills 

but no nausea no vomiting.  No diaphoresis.  She does have a family history of 

DVT.  She does smoke.


Allergies:  


Coded Allergies:  


     NO KNOWN ALLERGIES (Verified  Allergy, Unknown, 18)


Uncoded Allergies:  


     unknown cause of itching (Allergy, Unknown, 14)


 Pt complains of itching but cause unknown





Patient History


Past Medical History:  see triage record, old chart reviewed


Past Surgical History:  other


Pertinent Family History:  none


Social History:  Reports: smoking


Last Menstrual Period:  18


Pregnant Now:  No


:  0


Para:  0


Immunizations:  other


Reviewed Nursing Documentation:  PMH: Agreed; PSxH: Agreed





Nursing Documentation-PMH


Past Medical History:  No History, Except For


Hx Cardiac Problems:  Yes


Hx Hypertension:  Yes


Hx Asthma:  Yes


Hx Cancer:  No


Hx Gastrointestinal Problems:  No


Hx Neurological Problems:  Yes - anxiety


Hx Cerebrovascular Accident:  Yes - brain aneurysm; clipped in 


Hx Headaches:  Yes


Hx Neurologic Surgery:  Yes - brain anuerysm clip 





Review of Systems


Eye:  Denies: eye pain, blurred vision


ENT:  Denies: ear pain, nose congestion, throat swelling


Respiratory:  Denies: cough, shortness of breath


Cardiovascular:  Reports: chest pain; Denies: palpitations


Gastrointestinal:  Denies: abdominal pain, diarrhea, nausea, vomiting


Musculoskeletal:  Denies: back pain, joint pain


Skin:  Denies: rash


Neurological:  Denies: headache, numbness


Endocrine:  Denies: increased thirst, increased urine


Hematologic/Lymphatic:  Denies: easy bruising


All Other Systems:  negative except mentioned in HPI





Physical Exam





Vital Signs








  Date Time  Temp Pulse Resp B/P (MAP) Pulse Ox O2 Delivery O2 Flow Rate FiO2


 


18 23:16 98.4 79 17 146/78 99 Room Air  





 98.4       





vitals normal


Sp02 EP Interpretation:  reviewed, normal


General Appearance:  well appearing, no apparent distress, alert


Head:  normocephalic, atraumatic


Eyes:  bilateral eye PERRL, bilateral eye EOMI


ENT:  hearing grossly normal, normal pharynx


Neck:  full range of motion, supple, no meningismus


Respiratory:  lungs clear, normal breath sounds, other - tender to palpation of 

left chest


Cardiovascular #1:  regular rate, rhythm, no murmur


Gastrointestinal:  normal bowel sounds, non tender, no mass, no organomegaly, 

no bruit, non-distended


Musculoskeletal:  back normal, gait/station normal, normal range of motion


Psychiatric:  mood/affect normal


Skin:  warm/dry





Medical Decision Making


Diagnostic Impression:  


 Primary Impression:  


 Chest pain


 Qualified Codes:  R07.9 - Chest pain, unspecified


 Additional Impression:  


 Anemia


 Qualified Codes:  D64.9 - Anemia, unspecified


ER Course


Patient presents with atypical chest pain.  No evidence of ACS, PE, dissection 

to name a few.  Findings on her CT scan are chronic.  We'll discharge home.  

Anemia much improved.


Lab Results Impression


labs unremarkable


EKG Diagnostic Results


Rate:  normal


Rhythm:  NSR


ST Segments:  no acute changes





Rhythm Strip Diag. Results


Rhythm Strip Time:  00:57


EP Interpretation:  yes


Rate:  60


Rhythm:  NSR, no PVC's, no ectopy





Chest X-Ray Diagnostic Results


Chest X-Ray Diagnostic Results :  


   Chest X-Ray Ordered:  Yes


   # of Views/Limited/Complete:  1 View


   Indication:  Chest Pain


   EP Interpretation:  Yes


   Interpretation:  no consolidation, no effusion, no pneumothorax


   Impression:  No acute disease


   Electronically Signed by:  Tacho Malhotra MD





CT/MRI/US Diagnostic Results


CT/MRI/US Diagnostic Results :  


   Imaging Test Ordered:  CT chest


   Impression


Read by radiologist.  Questionable 3 mm pleural-based nodule in left lower 

lobe.  No PE.





Last Vital Signs








  Date Time  Temp Pulse Resp B/P (MAP) Pulse Ox O2 Delivery O2 Flow Rate FiO2


 


18 23:16 98.4 79 17 146/78 99 Room Air  





 98.4       








Status:  improved


Disposition:  HOME, SELF-CARE


Condition:  Stable


Scripts


Ibuprofen* (MOTRIN*) 600 Mg Tablet


600 MG ORAL THREE TIMES A DAY, #30 TAB 0 Refills


   Prov: TACHO MALHOTRA M.D.         18


Patient Instructions:  Nonspecific Chest Pain





Additional Instructions:  


Follow-up with your doctor in 7 days.  Return if worse.











TACHO MALHOTRA M.D. Sep 24, 2018 23:36

## 2018-09-25 NOTE — DIAGNOSTIC IMAGING REPORT
ndication: Chest pain for one day

 

Technique: IV administration nonionic contrast. Spiral acquisitions obtained from the

lung bases to the lung apices. Multiplanar and 3-D reconstructions were generated.

Total dose length product 621.79 mGycm. CTDIvol(s) 17.93 mGy.  Dose reduction

achieved using automated exposure control

 

 

Comparison: Chest radiograph from one hour earlier, noncontrast CT scan 10/27/2014

 

Findings: Pulmonary arteries are well opacified. No intraluminal filling defects or

other findings to suggest acute pulmonary embolus are evident. The heart is enlarged,

but no evidence of isolated right ventricular dilatation is demonstrated. No evidence

of thoracic aortic aneurysm or dissection. No pulmonary artery dilatation.

 

The lungs demonstrate peripheral fibrotic changes in the upper lobes bilaterally.

Posterior dependent atelectatic changes are seen in the lower lobes. No focal

airspace consolidation. No effusions. Some linear scarring or atelectasis is seen at

the left lung base 2 mm nodule is demonstrated in the posterior left lower lobe,

image 59 of series 7 and image 291 of series 12, also evident previously and

unchanged

 

No pericardial effusion. The mediastinum is diffusely edematous. The esophagus is

unremarkable. Prominent nodes seen in the right hilum measuring 15 x 19 mm, also

evident on prior contrast CT. No mediastinal adenopathy. Included portions of the

thyroid are unremarkable. No axillary or chest wall mass or adenopathy.

 

The included upper abdominal anatomy is remarkable for the presence of a 1 cm cyst

within the liver. Other subcentimeter low-attenuation lesions also demonstrated

within the liver. These are too small to characterize.

 

Impression: Negative for evidence of acute pulmonary embolus or other acute thoracic

process

 

Cardiomegaly

 

Minimal pulmonary fibrotic changes bilaterally

 

Prominent right hilar node, nonspecific as regards etiology, chest CT angiogram

11/12/2013, unchanged from 11/12/2013

 

Stable peripheral left lower lobe nodule, presumed benign

 

Incidental finding of 1 cm cyst within the liver. Other subcentimeter low-attenuation

lesions in the liver too small to characterize, most likely benign simple cysts or

bile hamartomas, no further follow-up necessary.

 

This agrees with the preliminary interpretation provided overnight by [employer]

teleradiology service

 

The CT scanner at [facility] is accredited by the American College of

Radiology and the scans are performed using protocols designed to limit radiation

exposure to as low as reasonably achievable to attain images of sufficient resolution

adequate for diagnostic evaluation.

## 2018-09-25 NOTE — CARDIOLOGY REPORT
--------------- APPROVED REPORT --------------





EKG Measurement

Heart [id number]

[id number] [id number]

[id number]

[id number]

[id number]





Normal sinus rhythm

Possible Left atrial enlargement

Borderline ECG

## 2018-09-25 NOTE — DIAGNOSTIC IMAGING REPORT
Indication: Chest pain

 

Technique: One view of the chest

 

Comparison: 6/15/2018

 

Findings: Heart is borderline enlarged. The aorta is ectatic. Lungs and pleural

spaces are clear. No significant change

 

Impression: No acute process